# Patient Record
Sex: FEMALE | Race: WHITE | NOT HISPANIC OR LATINO | Employment: OTHER | URBAN - METROPOLITAN AREA
[De-identification: names, ages, dates, MRNs, and addresses within clinical notes are randomized per-mention and may not be internally consistent; named-entity substitution may affect disease eponyms.]

---

## 2018-04-19 DIAGNOSIS — E78.49 OTHER HYPERLIPIDEMIA: ICD-10-CM

## 2018-04-19 DIAGNOSIS — N92.4 PERIMENOPAUSAL MENORRHAGIA: ICD-10-CM

## 2018-04-19 DIAGNOSIS — F32.A DEPRESSION, UNSPECIFIED DEPRESSION TYPE: ICD-10-CM

## 2018-04-19 DIAGNOSIS — G89.4 CHRONIC PAIN SYNDROME: ICD-10-CM

## 2018-04-19 DIAGNOSIS — I10 HYPERTENSION, UNSPECIFIED TYPE: ICD-10-CM

## 2018-04-19 DIAGNOSIS — K21.9 GASTROESOPHAGEAL REFLUX DISEASE WITHOUT ESOPHAGITIS: ICD-10-CM

## 2018-04-19 RX ORDER — OXYCODONE HYDROCHLORIDE 30 MG/1
30 TABLET ORAL 2 TIMES DAILY
COMMUNITY
Start: 2018-04-18 | End: 2021-10-28

## 2018-04-19 RX ORDER — SIMVASTATIN 20 MG/1
20 TABLET, FILM COATED ORAL DAILY
COMMUNITY
Start: 2018-04-18 | End: 2021-10-28

## 2018-04-19 RX ORDER — CEFAZOLIN SODIUM 2 G/50ML
2 SOLUTION INTRAVENOUS
Status: CANCELLED | OUTPATIENT
Start: 2018-04-19

## 2018-04-19 RX ORDER — ALBUTEROL SULFATE 0.83 MG/ML
2.5 SOLUTION RESPIRATORY (INHALATION) EVERY 4 HOURS PRN
Status: CANCELLED | OUTPATIENT
Start: 2018-04-19

## 2018-04-19 RX ORDER — SODIUM CHLORIDE, SODIUM LACTATE, POTASSIUM CHLORIDE, CALCIUM CHLORIDE 600; 310; 30; 20 MG/100ML; MG/100ML; MG/100ML; MG/100ML
INJECTION, SOLUTION INTRAVENOUS CONTINUOUS
Status: CANCELLED | OUTPATIENT
Start: 2018-04-19

## 2018-04-19 RX ORDER — AMLODIPINE BESYLATE 5 MG/1
5 TABLET ORAL DAILY
COMMUNITY
Start: 2018-04-18 | End: 2021-10-28

## 2018-04-19 RX ORDER — ESCITALOPRAM OXALATE 20 MG/1
20 TABLET ORAL DAILY
COMMUNITY
Start: 2018-04-18 | End: 2021-10-28

## 2018-04-19 RX ORDER — PANTOPRAZOLE SODIUM 40 MG/1
20 TABLET, DELAYED RELEASE ORAL 2 TIMES DAILY
COMMUNITY
Start: 2018-04-18 | End: 2021-10-28

## 2018-04-19 NOTE — PROGRESS NOTES
[facility]  Obstetrics & Gynecology  Consult Note    Patient Name: Ana Sierra  MRN: [id number]  Admission Date: (Not on file)  Hospital Length of Stay: 0 days  Code Status: [unfilled]  Primary Care Provider: No primary care provider on file.  Principal Problem: [unfilled]    Consults  Subjective:54 yo . w peirmen bleed and suspected 5 mm endo polyp... loestrin dc 3 d ..  psuedcholinestrace deficiency.. Recent right knee arthroscopy w post op infection     Chief Complaint: irregular bleed53 yo . w peirmen bleed and suspected 5 mm endo polyp... loestrin dc 3 d ..  psuedcholinestrace deficiency.. Recent right knee arthroscopy w post op infection    History of Present Illness: *54 yo . w peirmen bleed and suspected 5 mm endo polyp... loestrin dc 3 d ..  psuedcholinestrace deficiency.. Recent right knee arthroscopy w post op infection**    No current outpatient prescriptions on file prior to visit.     No current facility-administered medications on file prior to visit.        Review of patient's allergies indicates:  Allergies not on file    Past Medical History:   Diagnosis Date    AC (acromioclavicular) joint bone spurs, unspecified laterality     Chronic pain     Depression     Herniated lumbar disc without myelopathy     Hyperlipidemia     Hypertension     Scoliosis      Obstetric History     No data available        Past Surgical History:   Procedure Laterality Date    BREAST SURGERY      Augmentation    CERVICAL SPINE SURGERY      KNEE SURGERY      MENISCECTOMY       Family History     None        Social History Main Topics    Smoking status: Not on file    Smokeless tobacco: Not on file    Alcohol use Not on file    Drug use: Unknown    Sexual activity: Not on file     Review of Systems   Constitutional: Negative.    HENT: Negative.    Eyes: Negative.    Respiratory: Negative.    Cardiovascular: Negative.  Negative for chest pain.   Gastrointestinal: Negative.     Endocrine: Negative.    Genitourinary: Negative.    Musculoskeletal: Negative.    Skin:  Negative.   Neurological: Negative.    Hematological: Negative.    Psychiatric/Behavioral: The patient is nervous/anxious.    Breast: negative.    All other systems reviewed and are negative.    Objective:     Vital Signs (Most Recent):    Vital Signs (24h Range):  [unfilled]        There is no height or weight on file to calculate BMI.  No LMP recorded.    Physical Exam:   Constitutional: She is oriented to person, place, and time. She appears well-developed and well-nourished.    HENT:   Head: Normocephalic and atraumatic.    Eyes: Conjunctivae and EOM are normal. Pupils are equal, round, and reactive to light.    Neck: Normal range of motion. Neck supple.    Cardiovascular: Normal rate, regular rhythm, normal heart sounds and intact distal pulses.     Pulmonary/Chest: Effort normal and breath sounds normal.        Abdominal: Soft. Bowel sounds are normal.     Genitourinary: Vagina normal.        Uterus Size: 7 cm       Neurological: She is alert and oriented to person, place, and time. She has normal reflexes.    Skin: Skin is warm and dry.    Psychiatric: She has a normal mood and affect. Her behavior is normal.       Laboratory:  I have personallly reviewed all pertinent lab results from the last 24 hours.    Diagnostic Results:      Assessment/Plan:     There are no hospital problems to display for this patient.      Dc hscope and possible polypectomy.  Anesthesia preop visit for pseudocholinestrase deficiency    Thank you for your consult. {SIGN OFF/FOLLOW-UP:48094}    Roberto Moreno MD  Obstetrics & Gynecology  [facility]

## 2018-04-19 NOTE — PROGRESS NOTES
[facility]  Obstetrics & Gynecology  History & Physical    Patient Name: Ana Sierra  MRN: [id number]  Admission Date: 2018  Primary Care Provider: Darius Wilde MD    Subjective: 52 yo . w peirmen bleed and suspected 5 mm endo polyp... loestrin dc 3 d ..  psuedcholinestrace deficiency.. Recent right knee arthroscopy w post op infection     Chief Complaint/Reason for Admission: perimenopausal bleeding    History of Present Illness: 52 yo . w peirmen bleed and suspected 5 mm endo polyp... loestrin dc 3 d ..  psuedcholinestrace deficiency.. Recent right knee arthroscopy w post op infection      Review of patient's allergies indicates:   Allergen Reactions    Sulfa (sulfonamide antibiotics)        Past Medical History:   Diagnosis Date    AC (acromioclavicular) joint bone spurs, unspecified laterality     Chronic pain     Depression     Herniated lumbar disc without myelopathy     Hyperlipidemia     Hypertension     Scoliosis      OB History    Para Term  AB Living   0 0 0 0 0 0   SAB TAB Ectopic Multiple Live Births   0 0 0 0 0           Past Surgical History:   Procedure Laterality Date    BREAST SURGERY      Augmentation    CERVICAL SPINE SURGERY      KNEE SURGERY      MENISCECTOMY       Family History     None        Social History Main Topics    Smoking status: Never Smoker    Smokeless tobacco: Never Used    Alcohol use Yes      Comment: Socially    Drug use: No    Sexual activity: Not Currently     Partners: Male     Birth control/ protection: None     Review of Systems       Objective: 52 yo . w peirmen bleed and suspected 5 mm endo polyp... loestrin dc 3 d ..  psuedcholinestrace deficiency.. Recent right knee arthroscopy w post op infection       Vital Signs (Most Recent):    Vital Signs (24h Range):  [unfilled]        There is no height or weight on file to calculate BMI.  No LMP recorded.    Physical Exam     Constitutional: She is oriented to  person, place, and time. She appears well-developed and well-nourished.    HENT:   Head: Normocephalic and atraumatic.    Eyes: Conjunctivae and EOM are normal. Pupils are equal, round, and reactive to light.    Neck: Normal range of motion. Neck supple.    Cardiovascular: Normal rate, regular rhythm, normal heart sounds and intact distal pulses.     Pulmonary/Chest: Effort normal and breath sounds normal.        Abdominal: Soft. Bowel sounds are normal.     Genitourinary: Vagina normal.        Uterus Size: 7 cm       Neurological: She is alert and oriented to person, place, and time. She has normal reflexes.    Skin: Skin is warm and dry.    Psychiatric: She has a normal mood and affect. Her behavior is normal.    Laboratory:  See pre op labs      Assessment/Plan: Dc hscope and possible polypectomy.  Anesthesia preop visit for pseudocholinestrase deficiency             Roberto Moreno MD  Obstetrics & Gynecology  [facility]

## 2018-04-24 ENCOUNTER — LAB VISIT (OUTPATIENT)
Dept: LAB | Facility: HOSPITAL | Age: 53
End: 2018-04-24
Attending: OBSTETRICS & GYNECOLOGY
Payer: COMMERCIAL

## 2018-04-24 DIAGNOSIS — N95.0 POSTMENOPAUSAL BLEEDING: Primary | ICD-10-CM

## 2018-04-24 DIAGNOSIS — N95.0 PMB (POSTMENOPAUSAL BLEEDING): ICD-10-CM

## 2018-04-24 LAB
ABO + RH BLD: NORMAL
ANION GAP SERPL CALC-SCNC: 9 MMOL/L
APTT BLDCRRT: 27 SEC
B-HCG UR QL: NEGATIVE
BLD GP AB SCN CELLS X3 SERPL QL: NORMAL
BUN SERPL-MCNC: 19 MG/DL
CALCIUM SERPL-MCNC: 9.4 MG/DL
CHLORIDE SERPL-SCNC: 102 MMOL/L
CO2 SERPL-SCNC: 29 MMOL/L
CREAT SERPL-MCNC: 0.8 MG/DL
ERYTHROCYTE [DISTWIDTH] IN BLOOD BY AUTOMATED COUNT: 13.1 %
EST. GFR  (AFRICAN AMERICAN): >60 ML/MIN/1.73 M^2
EST. GFR  (NON AFRICAN AMERICAN): >60 ML/MIN/1.73 M^2
GLUCOSE SERPL-MCNC: 95 MG/DL
HCT VFR BLD AUTO: 40.1 %
HGB BLD-MCNC: 13.6 G/DL
INR PPP: 0.9
MCH RBC QN AUTO: 31.7 PG
MCHC RBC AUTO-ENTMCNC: 33.9 G/DL
MCV RBC AUTO: 94 FL
PLATELET # BLD AUTO: 242 K/UL
PMV BLD AUTO: 9.9 FL
POTASSIUM SERPL-SCNC: 4.5 MMOL/L
PROTHROMBIN TIME: 10.6 SEC
RBC # BLD AUTO: 4.29 M/UL
SODIUM SERPL-SCNC: 140 MMOL/L
WBC # BLD AUTO: 5.26 K/UL

## 2018-04-24 PROCEDURE — 85027 COMPLETE CBC AUTOMATED: CPT

## 2018-04-24 PROCEDURE — 81025 URINE PREGNANCY TEST: CPT

## 2018-04-24 PROCEDURE — 85730 THROMBOPLASTIN TIME PARTIAL: CPT

## 2018-04-24 PROCEDURE — 85610 PROTHROMBIN TIME: CPT

## 2018-04-24 PROCEDURE — 36415 COLL VENOUS BLD VENIPUNCTURE: CPT

## 2018-04-24 PROCEDURE — 86901 BLOOD TYPING SEROLOGIC RH(D): CPT

## 2018-04-24 PROCEDURE — 80048 BASIC METABOLIC PNL TOTAL CA: CPT

## 2018-04-25 ENCOUNTER — HOSPITAL ENCOUNTER (OUTPATIENT)
Facility: HOSPITAL | Age: 53
Discharge: HOME | End: 2018-04-25
Attending: OBSTETRICS & GYNECOLOGY | Admitting: OBSTETRICS & GYNECOLOGY
Payer: COMMERCIAL

## 2018-04-25 ENCOUNTER — ANESTHESIA EVENT (OUTPATIENT)
Dept: SURGERY | Facility: HOSPITAL | Age: 53
End: 2018-04-25
Payer: COMMERCIAL

## 2018-04-25 ENCOUNTER — ANESTHESIA (OUTPATIENT)
Dept: SURGERY | Facility: HOSPITAL | Age: 53
End: 2018-04-25
Payer: COMMERCIAL

## 2018-04-25 VITALS
WEIGHT: 170 LBS | HEART RATE: 84 BPM | TEMPERATURE: 98 F | HEIGHT: 64 IN | RESPIRATION RATE: 18 BRPM | DIASTOLIC BLOOD PRESSURE: 98 MMHG | BODY MASS INDEX: 29.02 KG/M2 | SYSTOLIC BLOOD PRESSURE: 117 MMHG | OXYGEN SATURATION: 98 %

## 2018-04-25 DIAGNOSIS — N92.4 PERIMENOPAUSAL MENORRHAGIA: ICD-10-CM

## 2018-04-25 PROBLEM — E88.89: Status: ACTIVE | Noted: 2018-04-25

## 2018-04-25 LAB — FSH SERPL-ACNC: 30.1 MIU/ML

## 2018-04-25 PROCEDURE — 36415 COLL VENOUS BLD VENIPUNCTURE: CPT

## 2018-04-25 PROCEDURE — 36000706: Performed by: OBSTETRICS & GYNECOLOGY

## 2018-04-25 PROCEDURE — 88305 TISSUE EXAM BY PATHOLOGIST: CPT | Mod: 26,,, | Performed by: PATHOLOGY

## 2018-04-25 PROCEDURE — 63600175 PHARM REV CODE 636 W HCPCS: Performed by: OBSTETRICS & GYNECOLOGY

## 2018-04-25 PROCEDURE — 37000008 HC ANESTHESIA 1ST 15 MINUTES: Performed by: OBSTETRICS & GYNECOLOGY

## 2018-04-25 PROCEDURE — 83001 ASSAY OF GONADOTROPIN (FSH): CPT

## 2018-04-25 PROCEDURE — 37000009 HC ANESTHESIA EA ADD 15 MINS: Performed by: OBSTETRICS & GYNECOLOGY

## 2018-04-25 PROCEDURE — S0028 INJECTION, FAMOTIDINE, 20 MG: HCPCS | Performed by: OBSTETRICS & GYNECOLOGY

## 2018-04-25 PROCEDURE — 25000242 PHARM REV CODE 250 ALT 637 W/ HCPCS: Performed by: OBSTETRICS & GYNECOLOGY

## 2018-04-25 PROCEDURE — 71000015 HC POSTOP RECOV 1ST HR: Performed by: OBSTETRICS & GYNECOLOGY

## 2018-04-25 PROCEDURE — 88305 TISSUE EXAM BY PATHOLOGIST: CPT | Performed by: PATHOLOGY

## 2018-04-25 PROCEDURE — 36000707: Performed by: OBSTETRICS & GYNECOLOGY

## 2018-04-25 PROCEDURE — S0020 INJECTION, BUPIVICAINE HYDRO: HCPCS | Performed by: OBSTETRICS & GYNECOLOGY

## 2018-04-25 PROCEDURE — D9220A PRA ANESTHESIA: Mod: CRNA,,, | Performed by: NURSE ANESTHETIST, CERTIFIED REGISTERED

## 2018-04-25 PROCEDURE — D9220A PRA ANESTHESIA: Mod: ANES,,, | Performed by: ANESTHESIOLOGY

## 2018-04-25 PROCEDURE — 25000003 PHARM REV CODE 250: Performed by: OBSTETRICS & GYNECOLOGY

## 2018-04-25 PROCEDURE — 71000033 HC RECOVERY, INTIAL HOUR: Performed by: OBSTETRICS & GYNECOLOGY

## 2018-04-25 PROCEDURE — 63600175 PHARM REV CODE 636 W HCPCS: Performed by: NURSE ANESTHETIST, CERTIFIED REGISTERED

## 2018-04-25 RX ORDER — SODIUM CHLORIDE, SODIUM LACTATE, POTASSIUM CHLORIDE, CALCIUM CHLORIDE 600; 310; 30; 20 MG/100ML; MG/100ML; MG/100ML; MG/100ML
INJECTION, SOLUTION INTRAVENOUS CONTINUOUS
Status: DISCONTINUED | OUTPATIENT
Start: 2018-04-25 | End: 2018-04-25 | Stop reason: HOSPADM

## 2018-04-25 RX ORDER — MEPERIDINE HYDROCHLORIDE 50 MG/ML
INJECTION INTRAMUSCULAR; INTRAVENOUS; SUBCUTANEOUS
Status: DISCONTINUED | OUTPATIENT
Start: 2018-04-25 | End: 2018-04-25

## 2018-04-25 RX ORDER — MIDAZOLAM HYDROCHLORIDE 5 MG/ML
INJECTION INTRAMUSCULAR; INTRAVENOUS
Status: DISCONTINUED | OUTPATIENT
Start: 2018-04-25 | End: 2018-04-25

## 2018-04-25 RX ORDER — MELOXICAM 15 MG/1
15 TABLET ORAL DAILY
COMMUNITY

## 2018-04-25 RX ORDER — FAMOTIDINE 20 MG/50ML
20 INJECTION, SOLUTION INTRAVENOUS 2 TIMES DAILY
Status: DISCONTINUED | OUTPATIENT
Start: 2018-04-25 | End: 2018-04-25 | Stop reason: HOSPADM

## 2018-04-25 RX ORDER — FAMOTIDINE 10 MG/ML
20 INJECTION INTRAVENOUS ONCE
Status: DISCONTINUED | OUTPATIENT
Start: 2018-04-25 | End: 2018-04-25 | Stop reason: HOSPADM

## 2018-04-25 RX ORDER — ONDANSETRON 2 MG/ML
4 INJECTION INTRAMUSCULAR; INTRAVENOUS DAILY PRN
Status: DISCONTINUED | OUTPATIENT
Start: 2018-04-25 | End: 2018-04-25 | Stop reason: HOSPADM

## 2018-04-25 RX ORDER — HYDROCODONE BITARTRATE AND ACETAMINOPHEN 5; 325 MG/1; MG/1
1 TABLET ORAL EVERY 6 HOURS PRN
Status: DISCONTINUED | OUTPATIENT
Start: 2018-04-25 | End: 2018-04-25 | Stop reason: HOSPADM

## 2018-04-25 RX ORDER — DIPHENHYDRAMINE HYDROCHLORIDE 50 MG/ML
12.5 INJECTION INTRAMUSCULAR; INTRAVENOUS
Status: DISCONTINUED | OUTPATIENT
Start: 2018-04-25 | End: 2018-04-25 | Stop reason: HOSPADM

## 2018-04-25 RX ORDER — ALBUTEROL SULFATE 0.83 MG/ML
2.5 SOLUTION RESPIRATORY (INHALATION) EVERY 4 HOURS PRN
Status: DISCONTINUED | OUTPATIENT
Start: 2018-04-25 | End: 2018-04-25 | Stop reason: HOSPADM

## 2018-04-25 RX ORDER — LIDOCAINE HYDROCHLORIDE 10 MG/ML
INJECTION INFILTRATION; PERINEURAL
Status: DISCONTINUED | OUTPATIENT
Start: 2018-04-25 | End: 2018-04-25 | Stop reason: HOSPADM

## 2018-04-25 RX ORDER — CEFAZOLIN SODIUM 2 G/50ML
2 SOLUTION INTRAVENOUS
Status: COMPLETED | OUTPATIENT
Start: 2018-04-25 | End: 2018-04-25

## 2018-04-25 RX ORDER — MORPHINE SULFATE 4 MG/ML
2 INJECTION, SOLUTION INTRAMUSCULAR; INTRAVENOUS EVERY 5 MIN PRN
Status: DISCONTINUED | OUTPATIENT
Start: 2018-04-25 | End: 2018-04-25 | Stop reason: HOSPADM

## 2018-04-25 RX ORDER — BUPIVACAINE HYDROCHLORIDE 5 MG/ML
INJECTION, SOLUTION EPIDURAL; INTRACAUDAL
Status: DISCONTINUED | OUTPATIENT
Start: 2018-04-25 | End: 2018-04-25 | Stop reason: HOSPADM

## 2018-04-25 RX ORDER — PROPOFOL 10 MG/ML
VIAL (ML) INTRAVENOUS
Status: DISCONTINUED | OUTPATIENT
Start: 2018-04-25 | End: 2018-04-25

## 2018-04-25 RX ORDER — SODIUM CHLORIDE, SODIUM LACTATE, POTASSIUM CHLORIDE, CALCIUM CHLORIDE 600; 310; 30; 20 MG/100ML; MG/100ML; MG/100ML; MG/100ML
125 INJECTION, SOLUTION INTRAVENOUS CONTINUOUS
Status: DISCONTINUED | OUTPATIENT
Start: 2018-04-25 | End: 2018-04-25 | Stop reason: HOSPADM

## 2018-04-25 RX ORDER — LIDOCAINE HYDROCHLORIDE 10 MG/ML
1 INJECTION, SOLUTION EPIDURAL; INFILTRATION; INTRACAUDAL; PERINEURAL ONCE
Status: CANCELLED | OUTPATIENT
Start: 2018-04-25 | End: 2018-04-25

## 2018-04-25 RX ADMIN — PROPOFOL 50 MG: 10 INJECTION, EMULSION INTRAVENOUS at 10:04

## 2018-04-25 RX ADMIN — SODIUM CHLORIDE, POTASSIUM CHLORIDE, SODIUM LACTATE AND CALCIUM CHLORIDE: 600; 310; 30; 20 INJECTION, SOLUTION INTRAVENOUS at 09:04

## 2018-04-25 RX ADMIN — FAMOTIDINE 20 MG: 20 INJECTION, SOLUTION INTRAVENOUS at 10:04

## 2018-04-25 RX ADMIN — PROPOFOL 50 MG: 10 INJECTION, EMULSION INTRAVENOUS at 11:04

## 2018-04-25 RX ADMIN — MEPERIDINE HYDROCHLORIDE 50 MG: 50 INJECTION INTRAMUSCULAR; INTRAVENOUS; SUBCUTANEOUS at 10:04

## 2018-04-25 RX ADMIN — CEFAZOLIN SODIUM 2 G: 2 SOLUTION INTRAVENOUS at 10:04

## 2018-04-25 RX ADMIN — MIDAZOLAM HYDROCHLORIDE 2 MG: 5 INJECTION, SOLUTION INTRAMUSCULAR; INTRAVENOUS at 10:04

## 2018-04-25 RX ADMIN — MIDAZOLAM HYDROCHLORIDE 3 MG: 5 INJECTION, SOLUTION INTRAMUSCULAR; INTRAVENOUS at 10:04

## 2018-04-25 RX ADMIN — ALBUTEROL SULFATE 2.5 MG: 2.5 SOLUTION RESPIRATORY (INHALATION) at 09:04

## 2018-04-25 NOTE — BRIEF OP NOTE
[facility] - Periop Services  Brief Operative Note     SUMMARY     Surgery Date: 4/25/2018     Surgeon(s) and Role:     * Roberto Moreno MD - Primary    Assisting Surgeon: None    Pre-op Diagnosis:  Abnormal perimenopausal bleeding [N92.4]  Uterine polyp [N84.0]    Post-op Diagnosis:  Post-Op Diagnosis Codes:     * Abnormal perimenopausal bleeding [N92.4]     * Uterine polyp [N84.0]    Procedure(s) (LRB):  [id number] (N/A)    Anesthesia: General/MAC    Description of the findings of the procedure: small ant ut wall polyp near or in the isthmus    Findings/Key Components: as above    Estimated Blood Loss: 5 mL         Specimens:   Specimen (12h ago through future)    None          Discharge Note    SUMMARY     Admit Date: 4/25/2018    Discharge Date and Time:  04/25/2018 11:20 AM    Hospital Course (synopsis of major diagnoses, care, treatment, and services provided during the course of the hospital stay): polyp excised w polyp forceps.  Ns for hscope.  -0- fluid deficet.  Iv mac      Final Diagnosis: Post-Op Diagnosis Codes:     * Abnormal perimenopausal bleeding [N92.4]     * Uterine polyp [N84.0]    Disposition: Home or Self Care    Follow Up/Patient Instructions: fsh today.  Cont hrt when dc    Medication norco 5 mg,   No discharge procedures on file.

## 2018-04-25 NOTE — OP NOTE
DATE OF PROCEDURE:  04/25/2018.    PREOPERATIVE DIAGNOSIS:  Postmenopausal bleeding, uterine polyp and  acetylcholinesterase deficiency.    POSTOPERATIVE DIAGNOSIS:  Uterine polyp is in the isthmic portion of the  uterus on the anterior wall.    PROCEDURE:  Dilatation and curettage, hysteroscopy, polypectomy.    PROCEDURE IN DETAIL:  After appropriate consents were obtained, the patient  was taken to the operating room, given IV MAC anesthesia.  A 10-toothed  tenaculum grasped the anterior lip of the cervix after examination under  anesthesia revealed a uterus that was normal size, shape and position.  No  adnexal masses were palpated.  A 0.5% Marcaine with lidocaine 1% was  administered as a paracervical block.  The cervix was dilated to a #15 Kleber  dilator.  Hysteroscope was inserted using normal saline as distention  medium and anterior polyp in the lower isthmic portion of the uterus was  identified.  The remainder of the uterine cavity and endocervical canal  were normal.  Polyp forceps were then inserted and the polyp was removed.   Hysteroscope was inserted.  The polyp had been completely removed.  An  endocervical curettage followed by an endometrial curettage was performed  until a gritty sensation was felt throughout.  Uterus sounded to 8 cm.  A  10-toothed tenaculum was removed.  The patient was taken to Recovery Room  in good condition.        DY/IN dd: 04/25/2018 11:36:41 (CDT)   td: 04/25/2018 14:36:26 (CDT)  Doc ID #[id number]   Job ID #[id number]    CC:

## 2018-04-25 NOTE — TRANSFER OF CARE
"Anesthesia Transfer of Care Note    Patient: Ana Sierra    Procedure(s) Performed: Procedure(s) (LRB):  [id number] (N/A)    Patient location: PACU    Anesthesia Type: MAC    Transport from OR: Transported from OR on room air with adequate spontaneous ventilation    Post pain: adequate analgesia    Post assessment: no apparent anesthetic complications and tolerated procedure well    Post vital signs: stable    Level of consciousness: awake, alert and oriented    Nausea/Vomiting: no nausea/vomiting    Complications: none    Transfer of care protocol was followed      Last vitals:   Visit Vitals  BP (!) 161/107 (BP Location: Left arm, Patient Position: Lying)   Pulse 82   Temp 36.6 °C (97.8 °F) (Oral)   Resp 20   Ht 5' 4" (1.626 m)   Wt 77.1 kg (170 lb)   LMP 04/25/2018   SpO2 98%   Breastfeeding? No   BMI 29.18 kg/m²     "

## 2018-04-25 NOTE — ANESTHESIA POSTPROCEDURE EVALUATION
"Anesthesia Post Evaluation    Patient: Ana Sierra    Procedure(s) Performed: Procedure(s) (LRB):  [id number] (N/A)    Final Anesthesia Type: MAC  Patient location during evaluation: PACU  Patient participation: Yes- Able to Participate  Level of consciousness: awake and alert  Pain management: adequate  Airway patency: patent  PONV status at discharge: No PONV  Anesthetic complications: no      Cardiovascular status: blood pressure returned to baseline  Respiratory status: unassisted  Hydration status: euvolemic  Follow-up not needed.        Visit Vitals  BP (!) 117/98   Pulse 84   Temp 36.7 °C (98 °F) (Oral)   Resp 18   Ht 5' 4" (1.626 m)   Wt 77.1 kg (170 lb)   LMP 04/25/2018   SpO2 98%   Breastfeeding? No   BMI 29.18 kg/m²       Pain/Mami Score: Pain Assessment Performed: Yes (4/25/2018 12:07 PM)  Presence of Pain: denies (4/25/2018 12:07 PM)  Mami Score: 10 (4/25/2018 12:00 PM)  Modified Mami Score: 20 (4/25/2018 12:00 PM)      "

## 2018-04-25 NOTE — ANESTHESIA PREPROCEDURE EVALUATION
04/25/2018  Ana Sierra is a 53 y.o., female.    Pre-op Assessment    I have reviewed the Patient Summary Reports.     I have reviewed the Nursing Notes.   I have reviewed the Medications.     Review of Systems  Anesthesia Hx:  Hx of Anesthetic complications  Personal Hx of Anesthesia complications  Pseudocholinesterase Deficiency   Cardiovascular:   Hypertension    Hepatic/GI:   GERD    Neurological:   Chronic Pain Syndrome       Physical Exam  General:  Well nourished    Airway/Jaw/Neck:  Airway Findings: Mouth Opening: Normal Tongue: Normal  General Airway Assessment: Adult  Mallampati: II  Improves to II with phonation.  TM Distance: Normal, at least 6 cm  Jaw/Neck Findings:  Neck ROM: Normal Extension, Painful       Chest/Lungs:  Chest/Lungs Findings: Clear to auscultation     Heart/Vascular:  Heart Findings: Rate: Normal  Rhythm: Regular Rhythm        Mental Status:  Mental Status Findings:  Cooperative, Alert and Oriented         Anesthesia Plan  Type of Anesthesia, risks & benefits discussed:  Anesthesia Type:  MAC, general  Patient's Preference:   Intra-op Monitoring Plan: standard ASA monitors  Intra-op Monitoring Plan Comments:   Post Op Pain Control Plan: IV/PO Opioids PRN  Post Op Pain Control Plan Comments:   Induction:    Beta Blocker:  Patient is not currently on a Beta-Blocker (No further documentation required).       Informed Consent: Patient understands risks and agrees with Anesthesia plan.  Questions answered. Anesthesia consent signed with patient.  ASA Score: 3     Day of Surgery Review of History & Physical: I have interviewed and examined the patient. I have reviewed the patient's H&P dated:

## 2018-04-26 NOTE — OP NOTE
DATE OF PROCEDURE:  04/25/2018    SURGEON:  Roberto Moreno MD.    ANESTHESIOLOGIST:  Aaron Olivas MD.    ANESTHESIA:  IV MAC with a paracervical block 0.5% Marcaine with 1%  Xylocaine.    PREOPERATIVE DIAGNOSIS:  Postmenopausal bleeding, uterine polyp.    POSTOPERATIVE DIAGNOSIS:  Postmenopausal bleeding, uterine polyp.    PROCEDURE:  Dilatation and curettage, hysteroscopy, polypectomy.    PROCEDURE IN DETAIL:  After appropriate consents were obtained, the patient  was taken to the Operating Room, given IV MAC, prepped and draped in usual  fashion in Jake stirrups and draped.  Bladder was emptied with a red  rubber catheter.  Examination under anesthesia revealed a uterus that was  normal size, shape and position.  No adnexal masses were palpated, a  10-toothed tenaculum grasped the anterior lip of the cervix.  A 10 mL  paracervical block was administered.  Cervix dilated to #15 Kleber dilator,  hysteroscope was inserted, negative fluid loss was noted.  Normal saline as  distention medium, anterior polyp was noted in the anterior wall of the  isthmus and it was removed by polyp forceps, an endocervical curettage  followed by an endometrial curettage was performed.  The uterus was  resounded to 10 cm and all tissues were submitted to Pathology.  The  10-toothed tenaculum was removed.  The patient was taken to Recovery Room  in good condition.        NGUYỄN/EMIL dd: 04/25/2018 13:46:35 (CDT)   td: 04/25/2018 20:21:07 (CDT)  Doc ID #[id number]   Job ID #[id number]    CC:

## 2018-06-27 NOTE — H&P
[facility] - Periop Services    Admit Date: 2018    Reason for Admission: [unfilled]     History of Present Illness  Ana Sierra is a 53 y.o. y.o.     PAST MEDICAL HISTORY:  Past Medical History:   Diagnosis Date    AC (acromioclavicular) joint bone spurs, unspecified laterality     Chronic pain     Depression     Herniated lumbar disc without myelopathy     Hyperlipidemia     Hypertension     Scoliosis         Past Surgical History:   Procedure Laterality Date    BREAST SURGERY      Augmentation    CERVICAL SPINE SURGERY      KNEE SURGERY      left elbow Left     w/ screws    MENISCECTOMY      THROAT SURGERY      removed tumor        CURRENT MEDICATIONS AND ALLERGIES:  Home Medications:   Prior to Admission medications    Medication Sig Start Date End Date Taking? Authorizing Provider   amLODIPine (NORVASC) 5 MG tablet Take 5 mg by mouth once daily. 18  Yes Historical Provider, MD   escitalopram oxalate (LEXAPRO) 20 MG tablet Take 20 mg by mouth once daily. 18  Yes Historical Provider, MD   meloxicam (MOBIC) 15 MG tablet Take 15 mg by mouth once daily.   Yes Historical Provider, MD   oxyCODONE (ROXICODONE) 30 MG Tab Take 30 mg by mouth 2 (two) times daily. 18  Yes Historical Provider, MD   pantoprazole (PROTONIX) 40 MG tablet Take 20 mg by mouth 2 (two) times daily. 18  Yes Historical Provider, MD   simvastatin (ZOCOR) 20 MG tablet Take 20 mg by mouth once daily. 18  Yes Historical Provider, MD        Sulfa (sulfonamide antibiotics)     Family History   Problem Relation Age of Onset    Hypertension Mother     Hyperlipidemia Mother     Heart disease Sister          reports that she has never smoked. She has never used smokeless tobacco. She reports that she drinks alcohol. She reports that she does not use drugs.     Data Unavailable     COMPREHENSIVE GYN HISTORY:  PAP History: Denies abnormal Paps.  Infection History: Denies STDs. Denies  "PID.  Benign History: Denies uterine fibroids. Denies ovarian cysts. Denies endometriosis. Denies other conditions.  Cancer History: Denies cervical cancer. Denies uterine cancer or hyperplasia. Denies ovarian cancer. Denies vulvar cancer or pre-cancer. Denies vaginal cancer or pre-cancer. Denies breast cancer. Denies colon cancer.  Sexual Activity History: Reports currently being sexually active  Menstrual History: Every days, flows for days. (Light, Mod, Heavy) flow.   Contraception:    ROS:   GENERAL: No weight changes. No swelling. No fatigue. No fever.   CARDIOVASCULAR: No chest pain. No shortness of breath. No leg cramps.   NEUROLOGICAL: No headaches. No vision changes.   BREASTS: No pain. No lumps. No discharge.   ABDOMEN: No pain. No nausea. No vomiting. No diarrhea. No constipation.   REPRODUCTIVE: No abnormal bleeding.   VULVA: No pain. No lesions. No itching.   VAGINA: No relaxation. No itching. No odor. No discharge. No lesions.   URINARY: No incontinence. No nocturia. No frequency. No dysuria.    BP (!) 117/98   Pulse 84   Temp 98 °F (36.7 °C) (Oral)   Resp 18   Ht 5' 4" (1.626 m)   Wt 77.1 kg (170 lb)   LMP 2018   SpO2 98%   Breastfeeding? No   BMI 29.18 kg/m²      PE:   APPEARANCE: Well nourished, well developed, in no acute distress.   AFFECT: WNL, alert and oriented x 3.   SKIN: No acne or hirsutism.   NECK: Neck symmetric, without masses or thyromegaly.   NODES: No inguinal, cervical, axillary or femoral lymph node enlargement.   CHEST: Good respiratory effort.   ABDOMEN: Soft. No tenderness or masses. No hepatosplenomegaly. No hernias. Midline scare form    BREASTS: Symmetrical, no skin changes, visible lesions, palpable masses or nipple discharge bilaterally.   PELVIC: External female genitalia without lesions. Female hair distribution. Adequate perineal body, Normal urethral meatus. Vagina moist and well rugated without lesions or discharge. No significant cystocele or " rectocele present. Cervix pink without lesions, discharge or tenderness. Uterus is 14-16 week size, regular, mobile and nontender. Adnexa without masses or tenderness.   EXTREMITIES: No edema    PROCEDURES:   D and c and hscope    DIAGNOSISpost men bleed    COUNSELING:      ASSESSMENT/PLAN:  Perimenopausal menorrhagia    Other orders  -     Cancel: Notify Physician - Potential Need of Opioid Reversal; Standing  -     Discontinue: albuterol nebulizer solution 2.5 mg; Take 3 mLs (2.5 mg total) by nebulization every 4 (four) hours as needed (shortness of breath).  -     IP VTE LOW RISK PATIENT; Standing  -     Cancel: Up ad queenie; Standing  -     Cancel: Place in Outpatient; Standing  -     Cancel: Diet NPO; Standing  -     Discontinue: lactated ringers infusion; Inject into the vein continuous.  -     cefazolin (ANCEF) 2 gram in dextrose 5% 50 mL IVPB (premix); Inject 50 mLs (2 g total) into the vein On call Procedure (Surgery).  -     Cancel: Notify physician; Standing  -     Cancel: Notify Physician/Vital Signs Parameters Urine output less than 0.5mL/kg/hr (with indwelling catheter) or 30 mL/hr (without indwelling catheter) or blood glucose greater than 200 mg/dL; Standing  -     Cancel: Void on call to OR; Standing  -     Cancel: Vital signs; Standing  -     Cancel: Admit to Phase 1 PACU, transfer to Phase 2 per protocol when indicated ; Standing  -     Cancel: Vital signs; Standing  -     Cancel: Vital signs; Standing  -     Cancel: Insert peripheral IV; Standing  -     Cancel: Use 1% lidocaine at IV site; Standing  -     Cancel: Consent for Anesthesia Services; Standing  -     Cancel: Nursing to confirm consent for anesthesia services; Standing  -     Cancel: Blood Consent ; Standing  -     Cancel: Nursing to confirm blood consent; Standing  -     Cancel: Verify: Patient has taken Beta Blocker in the last 24 hours if prescribed. If patient has not taken prescribed Beta Blocker in the last 24 hours notify  Anesthesia.; Standing  -     Cancel: Intake and output Per protocol; Standing  -     Cancel: Apply warming blanket; Standing  -     Cancel: Diet NPO Except for: Medication; Standing  -     Discontinue: lactated ringers infusion; Inject 125 mL/hr into the vein continuous.  -     Discontinue: morphine injection 2 mg; Inject 0.5 mLs (2 mg total) into the vein every 5 (five) minutes as needed (Severe pain 6-10/10 pain scale).  -     Cancel: lidocaine (PF) 10 mg/ml (1%) injection 10 mg; Inject 1 mL (10 mg total) into the skin once.  -     Discontinue: diphenhydrAMINE injection 12.5 mg; Inject 0.25 mLs (12.5 mg total) into the vein as needed for Itching.  -     Discontinue: famotidine (PF) injection 20 mg; Inject 2 mLs (20 mg total) into the vein once.  -     Discontinue: ondansetron injection 4 mg; Inject 4 mg into the vein daily as needed for Nausea/Vomiting (1st choice) - use as first treatment.  -     Discontinue: promethazine (PHENERGAN) 6.25 mg in dextrose 5 % 50 mL IVPB; Inject 6.25 mg into the vein every 10 (ten) minutes as needed for Nausea/Vomiting (2nd choice) - use if first choice is not effective.  -     Cancel: Notify Anesthesiologist if Patient on Home Insulin Pump; Standing  -     Cancel: POCT glucose; Standing  -     Cancel: Pregnancy, urine rapid; Standing  -     Cancel: POCT glucose; Standing  -     Discontinue: famotidine IVPB 20 mg; Inject 50 mLs (20 mg total) into the vein 2 (two) times daily.  -     Cancel: Straight Cath; Standing  -     Specimen to Pathology - Surgery; Standing  -     Cancel: DISCHARGE PATIENT; Standing  -     Discontinue: hydrocodone-acetaminophen 5-325mg per tablet 1 tablet; Take 1 tablet by mouth every 6 (six) hours as needed (for pain).  -     Cancel: Follicle stimulating hormone; Standing  -     Discontinue: bupivacaine (PF) injection; as needed.  -     Discontinue: lidocaine HCL 10 mg/ml (1%) injection; as needed.          Roberto Moreno

## 2020-01-30 ENCOUNTER — LAB VISIT (OUTPATIENT)
Dept: LAB | Facility: HOSPITAL | Age: 55
End: 2020-01-30
Attending: FAMILY MEDICINE
Payer: COMMERCIAL

## 2020-01-30 DIAGNOSIS — Z00.00 ROUTINE GENERAL MEDICAL EXAMINATION AT A HEALTH CARE FACILITY: Primary | ICD-10-CM

## 2020-01-30 LAB
ALBUMIN SERPL BCP-MCNC: 4.7 G/DL (ref 3.5–5.2)
ALP SERPL-CCNC: 56 U/L (ref 55–135)
ALT SERPL W/O P-5'-P-CCNC: 23 U/L (ref 10–44)
ANION GAP SERPL CALC-SCNC: 12 MMOL/L (ref 8–16)
AST SERPL-CCNC: 28 U/L (ref 10–40)
BASOPHILS # BLD AUTO: 0.03 K/UL (ref 0–0.2)
BASOPHILS NFR BLD: 0.7 % (ref 0–1.9)
BILIRUB SERPL-MCNC: 0.9 MG/DL (ref 0.1–1)
BUN SERPL-MCNC: 17 MG/DL (ref 6–20)
CALCIUM SERPL-MCNC: 9.1 MG/DL (ref 8.7–10.5)
CHLORIDE SERPL-SCNC: 100 MMOL/L (ref 95–110)
CHOLEST SERPL-MCNC: 175 MG/DL (ref 120–199)
CHOLEST/HDLC SERPL: 3 {RATIO} (ref 2–5)
CO2 SERPL-SCNC: 27 MMOL/L (ref 23–29)
CREAT SERPL-MCNC: 0.6 MG/DL (ref 0.5–1.4)
DIFFERENTIAL METHOD: ABNORMAL
EOSINOPHIL # BLD AUTO: 0.1 K/UL (ref 0–0.5)
EOSINOPHIL NFR BLD: 1.1 % (ref 0–8)
ERYTHROCYTE [DISTWIDTH] IN BLOOD BY AUTOMATED COUNT: 12 % (ref 11.5–14.5)
EST. GFR  (AFRICAN AMERICAN): >60 ML/MIN/1.73 M^2
EST. GFR  (NON AFRICAN AMERICAN): >60 ML/MIN/1.73 M^2
GLUCOSE SERPL-MCNC: 96 MG/DL (ref 70–110)
HCT VFR BLD AUTO: 40.1 % (ref 37–48.5)
HDLC SERPL-MCNC: 59 MG/DL (ref 40–75)
HDLC SERPL: 33.7 % (ref 20–50)
HGB BLD-MCNC: 13.3 G/DL (ref 12–16)
IMM GRANULOCYTES # BLD AUTO: 0.01 K/UL (ref 0–0.04)
IMM GRANULOCYTES NFR BLD AUTO: 0.2 % (ref 0–0.5)
LDLC SERPL CALC-MCNC: 100 MG/DL (ref 63–159)
LYMPHOCYTES # BLD AUTO: 1.2 K/UL (ref 1–4.8)
LYMPHOCYTES NFR BLD: 26.4 % (ref 18–48)
MCH RBC QN AUTO: 31.8 PG (ref 27–31)
MCHC RBC AUTO-ENTMCNC: 33.2 G/DL (ref 32–36)
MCV RBC AUTO: 96 FL (ref 82–98)
MONOCYTES # BLD AUTO: 0.5 K/UL (ref 0.3–1)
MONOCYTES NFR BLD: 11.1 % (ref 4–15)
NEUTROPHILS # BLD AUTO: 2.8 K/UL (ref 1.8–7.7)
NEUTROPHILS NFR BLD: 60.5 % (ref 38–73)
NONHDLC SERPL-MCNC: 116 MG/DL
NRBC BLD-RTO: 0 /100 WBC
PLATELET # BLD AUTO: 227 K/UL (ref 150–350)
PMV BLD AUTO: 10.3 FL (ref 9.2–12.9)
POTASSIUM SERPL-SCNC: 3.8 MMOL/L (ref 3.5–5.1)
PROT SERPL-MCNC: 7.7 G/DL (ref 6–8.4)
RBC # BLD AUTO: 4.18 M/UL (ref 4–5.4)
SODIUM SERPL-SCNC: 139 MMOL/L (ref 136–145)
TRIGL SERPL-MCNC: 80 MG/DL (ref 30–150)
TSH SERPL DL<=0.005 MIU/L-ACNC: 0.98 UIU/ML (ref 0.34–5.6)
WBC # BLD AUTO: 4.58 K/UL (ref 3.9–12.7)

## 2020-01-30 PROCEDURE — 80053 COMPREHEN METABOLIC PANEL: CPT

## 2020-01-30 PROCEDURE — 80061 LIPID PANEL: CPT

## 2020-01-30 PROCEDURE — 85025 COMPLETE CBC W/AUTO DIFF WBC: CPT

## 2020-01-30 PROCEDURE — 84443 ASSAY THYROID STIM HORMONE: CPT

## 2020-01-30 PROCEDURE — 36415 COLL VENOUS BLD VENIPUNCTURE: CPT

## 2020-02-06 DIAGNOSIS — R06.02 SOB (SHORTNESS OF BREATH): Primary | ICD-10-CM

## 2020-02-06 DIAGNOSIS — Z77.22 TOBACCO SMOKE EXPOSURE: ICD-10-CM

## 2020-02-17 ENCOUNTER — PROCEDURE VISIT (OUTPATIENT)
Dept: RESPIRATORY THERAPY | Facility: HOSPITAL | Age: 55
End: 2020-02-17
Attending: FAMILY MEDICINE
Payer: COMMERCIAL

## 2020-02-17 ENCOUNTER — HOSPITAL ENCOUNTER (OUTPATIENT)
Dept: RADIOLOGY | Facility: HOSPITAL | Age: 55
Discharge: HOME | End: 2020-02-17
Attending: FAMILY MEDICINE
Payer: COMMERCIAL

## 2020-02-17 DIAGNOSIS — Z77.22 TOBACCO SMOKE EXPOSURE: ICD-10-CM

## 2020-02-17 DIAGNOSIS — R06.02 SOB (SHORTNESS OF BREATH): ICD-10-CM

## 2020-02-17 PROCEDURE — 94060 EVALUATION OF WHEEZING: CPT

## 2020-02-17 PROCEDURE — 94727 GAS DIL/WSHOT DETER LNG VOL: CPT

## 2020-02-17 PROCEDURE — 94760 N-INVAS EAR/PLS OXIMETRY 1: CPT

## 2020-02-17 PROCEDURE — 71046 X-RAY EXAM CHEST 2 VIEWS: CPT | Mod: TC,FY

## 2020-02-17 PROCEDURE — 25000242 PHARM REV CODE 250 ALT 637 W/ HCPCS: Performed by: FAMILY MEDICINE

## 2020-02-17 PROCEDURE — 94729 DIFFUSING CAPACITY: CPT

## 2020-02-17 PROCEDURE — 71046 X-RAY EXAM CHEST 2 VIEWS: CPT | Mod: 26,,, | Performed by: RADIOLOGY

## 2020-02-17 PROCEDURE — 71046 XR CHEST PA AND LATERAL: ICD-10-PCS | Mod: 26,,, | Performed by: RADIOLOGY

## 2020-02-17 RX ORDER — ALBUTEROL SULFATE 0.83 MG/ML
2.5 SOLUTION RESPIRATORY (INHALATION) ONCE
Status: COMPLETED | OUTPATIENT
Start: 2020-02-17 | End: 2020-02-17

## 2020-02-17 RX ADMIN — ALBUTEROL SULFATE 2.5 MG: 2.5 SOLUTION RESPIRATORY (INHALATION) at 03:02

## 2020-02-19 LAB
BRPFT: ABNORMAL
DLCO ADJ PRE: 16.56 ML/(MIN*MMHG) (ref 18.29–29.76)
DLCO SINGLE BREATH LLN: 18.29
DLCO SINGLE BREATH PRE REF: 68.9 %
DLCO SINGLE BREATH REF: 24.02
DLCOC SBVA LLN: 3.33
DLCOC SBVA PRE REF: 101.9 %
DLCOC SBVA REF: 4.84
DLCOC SINGLE BREATH LLN: 18.29
DLCOC SINGLE BREATH PRE REF: 68.9 %
DLCOC SINGLE BREATH REF: 24.02
DLCOVA LLN: 3.33
DLCOVA PRE REF: 101.9 %
DLCOVA PRE: 4.93 ML/(MIN*MMHG*L) (ref 3.33–6.34)
DLCOVA REF: 4.84
DLVAADJ PRE: 4.93 ML/(MIN*MMHG*L) (ref 3.33–6.34)
ERVN2 LLN: 0.92
ERVN2 PRE REF: 55.1 %
ERVN2 PRE: 0.51 L (ref 0.92–0.92)
ERVN2 REF: 0.92
FEF 25 75 CHG: 14.8 %
FEF 25 75 LLN: 1.42
FEF 25 75 POST REF: 77 %
FEF 25 75 PRE REF: 67.1 %
FEF 25 75 REF: 2.6
FET100 CHG: -6.1 %
FEV1 CHG: 10 %
FEV1 FVC CHG: 0.9 %
FEV1 FVC LLN: 69
FEV1 FVC POST REF: 99.5 %
FEV1 FVC PRE REF: 98.6 %
FEV1 FVC REF: 80
FEV1 LLN: 2.09
FEV1 POST REF: 77.7 %
FEV1 PRE REF: 70.6 %
FEV1 REF: 2.7
FRCN2 LLN: 1.88
FRCN2 PRE REF: 78.8 %
FRCN2 REF: 2.7
FVC CHG: 9 %
FVC LLN: 2.63
FVC POST REF: 77.7 %
FVC PRE REF: 71.2 %
FVC REF: 3.39
IVC PRE: 2.03 L (ref 2.63–4.16)
IVC SINGLE BREATH LLN: 2.63
IVC SINGLE BREATH PRE REF: 59.9 %
IVC SINGLE BREATH REF: 3.39
MVV LLN: 86
MVV PRE REF: 52.2 %
MVV REF: 102
PEF CHG: 15.8 %
PEF LLN: 4.97
PEF POST REF: 97.2 %
PEF PRE REF: 84 %
PEF REF: 6.69
POST FEF 25 75: 2 L/S (ref 1.42–3.78)
POST FET 100: 8.25 SEC
POST FEV1 FVC: 79.7 % (ref 68.8–91.48)
POST FEV1: 2.1 L (ref 2.09–3.32)
POST FVC: 2.64 L (ref 2.63–4.16)
POST PEF: 6.5 L/S (ref 4.97–8.41)
PRE DLCO: 16.56 ML/(MIN*MMHG) (ref 18.29–29.76)
PRE FEF 25 75: 1.74 L/S (ref 1.42–3.78)
PRE FET 100: 8.78 SEC
PRE FEV1 FVC: 78.99 % (ref 68.8–91.48)
PRE FEV1: 1.91 L (ref 2.09–3.32)
PRE FRC N2: 2.13 L
PRE FVC: 2.42 L (ref 2.63–4.16)
PRE MVV: 53 L/MIN (ref 86.31–116.77)
PRE PEF: 5.62 L/S (ref 4.97–8.41)
RVN2 LLN: 1.21
RVN2 PRE REF: 75.5 %
RVN2 PRE: 1.34 L (ref 1.21–2.36)
RVN2 REF: 1.78
RVN2TLCN2 LLN: 27.05
RVN2TLCN2 PRE REF: 97.6 %
RVN2TLCN2 PRE: 35.74 % (ref 27.05–46.23)
RVN2TLCN2 REF: 36.64
TLCN2 LLN: 3.98
TLCN2 PRE REF: 75.7 %
TLCN2 PRE: 3.76 L (ref 3.98–5.96)
TLCN2 REF: 4.97
VA PRE: 3.36 L (ref 4.82–4.82)
VA SINGLE BREATH LLN: 4.82
VA SINGLE BREATH PRE REF: 69.8 %
VA SINGLE BREATH REF: 4.82
VCMAXN2 LLN: 2.63
VCMAXN2 PRE REF: 71.2 %
VCMAXN2 PRE: 2.42 L (ref 2.63–4.16)
VCMAXN2 REF: 3.39

## 2020-02-24 DIAGNOSIS — J44.9 COPD (CHRONIC OBSTRUCTIVE PULMONARY DISEASE): ICD-10-CM

## 2020-02-24 DIAGNOSIS — R93.89 ABNORMAL CXR: Primary | ICD-10-CM

## 2020-02-24 DIAGNOSIS — R06.02 SOB (SHORTNESS OF BREATH): ICD-10-CM

## 2020-11-12 DIAGNOSIS — R93.89 ABNORMAL CHEST X-RAY: Primary | ICD-10-CM

## 2020-11-23 ENCOUNTER — HOSPITAL ENCOUNTER (OUTPATIENT)
Dept: RADIOLOGY | Facility: HOSPITAL | Age: 55
Discharge: HOME | End: 2020-11-23
Attending: FAMILY MEDICINE
Payer: COMMERCIAL

## 2020-11-23 DIAGNOSIS — R93.89 ABNORMAL CHEST X-RAY: ICD-10-CM

## 2020-11-23 PROCEDURE — 71046 X-RAY EXAM CHEST 2 VIEWS: CPT | Mod: 26,,, | Performed by: RADIOLOGY

## 2020-11-23 PROCEDURE — 71046 XR CHEST PA AND LATERAL: ICD-10-PCS | Mod: 26,,, | Performed by: RADIOLOGY

## 2020-11-23 PROCEDURE — 71046 X-RAY EXAM CHEST 2 VIEWS: CPT | Mod: TC,FY

## 2021-10-28 PROBLEM — I50.9 CHRONIC CONGESTIVE HEART FAILURE: Status: ACTIVE | Noted: 2021-10-28

## 2021-10-28 PROBLEM — Z01.419 WOMEN'S ANNUAL ROUTINE GYNECOLOGICAL EXAMINATION: Status: ACTIVE | Noted: 2021-10-28

## (undated) DEVICE — CATH 16FR URETHRL RED RUB

## (undated) DEVICE — SEE MEDLINE ITEM 157116

## (undated) DEVICE — COVER SURG LIGHT HANDLE

## (undated) DEVICE — TRAY DRY SKIN SCRUB PREP

## (undated) DEVICE — SOL NACL IRR 3000ML

## (undated) DEVICE — SPONGE NOVAPLUS LAP 18X18IN

## (undated) DEVICE — DRAPE UNDER BUTTOCKS WITH POUCH

## (undated) DEVICE — DRESSING TELFA PAD N ADH 2X3

## (undated) DEVICE — PACK DRAPE PERI/GYN TIBURON

## (undated) DEVICE — SEE L#853

## (undated) DEVICE — TUBING OUTFLOW/HYSTEROSCOPY

## (undated) DEVICE — TUBING INFLOW HYSTEROSCOPY

## (undated) DEVICE — SLEEVE SCD EXPRESS CALF MEDIUM

## (undated) DEVICE — CANISTER SUCTION 3000CC

## (undated) DEVICE — SYR B-D DISP CONTROL 10CC100/C

## (undated) DEVICE — GLOVE SURG ULTRA TOUCH 7

## (undated) DEVICE — SOL NACL IRR 1000ML BTL

## (undated) DEVICE — SOL IRR NACL .9% 3000ML

## (undated) DEVICE — GLOVE SURG ULTRA TOUCH 8

## (undated) DEVICE — GAUZE SPONGE XRAY 4X4